# Patient Record
Sex: MALE | Race: OTHER | HISPANIC OR LATINO | URBAN - METROPOLITAN AREA
[De-identification: names, ages, dates, MRNs, and addresses within clinical notes are randomized per-mention and may not be internally consistent; named-entity substitution may affect disease eponyms.]

---

## 2023-09-28 ENCOUNTER — EMERGENCY (EMERGENCY)
Facility: HOSPITAL | Age: 33
LOS: 1 days | Discharge: HOME | End: 2023-09-28
Attending: EMERGENCY MEDICINE | Admitting: EMERGENCY MEDICINE
Payer: COMMERCIAL

## 2023-09-28 VITALS
HEART RATE: 92 BPM | DIASTOLIC BLOOD PRESSURE: 79 MMHG | RESPIRATION RATE: 16 BRPM | SYSTOLIC BLOOD PRESSURE: 129 MMHG | OXYGEN SATURATION: 98 % | TEMPERATURE: 98 F | HEIGHT: 72 IN | WEIGHT: 158.73 LBS

## 2023-09-28 DIAGNOSIS — Z88.1 ALLERGY STATUS TO OTHER ANTIBIOTIC AGENTS STATUS: ICD-10-CM

## 2023-09-28 DIAGNOSIS — Z87.438 PERSONAL HISTORY OF OTHER DISEASES OF MALE GENITAL ORGANS: ICD-10-CM

## 2023-09-28 DIAGNOSIS — N50.811 RIGHT TESTICULAR PAIN: ICD-10-CM

## 2023-09-28 DIAGNOSIS — A52.76: ICD-10-CM

## 2023-09-28 LAB
APPEARANCE UR: CLEAR — SIGNIFICANT CHANGE UP
BACTERIA # UR AUTO: PRESENT /HPF — SIGNIFICANT CHANGE UP
BILIRUB UR-MCNC: NEGATIVE — SIGNIFICANT CHANGE UP
COLOR SPEC: YELLOW — SIGNIFICANT CHANGE UP
COMMENT - URINE: SIGNIFICANT CHANGE UP
DIFF PNL FLD: NEGATIVE — SIGNIFICANT CHANGE UP
GLUCOSE UR QL: NEGATIVE MG/DL — SIGNIFICANT CHANGE UP
HIV 1 & 2 AB SERPL IA.RAPID: SIGNIFICANT CHANGE UP
KETONES UR-MCNC: NEGATIVE MG/DL — SIGNIFICANT CHANGE UP
LEUKOCYTE ESTERASE UR-ACNC: ABNORMAL
NITRITE UR-MCNC: NEGATIVE — SIGNIFICANT CHANGE UP
PH UR: 5.5 — SIGNIFICANT CHANGE UP (ref 5–8)
PROT UR-MCNC: NEGATIVE MG/DL — SIGNIFICANT CHANGE UP
RBC CASTS # UR COMP ASSIST: 0 /HPF — SIGNIFICANT CHANGE UP (ref 0–4)
SP GR SPEC: 1.02 — SIGNIFICANT CHANGE UP (ref 1–1.03)
UROBILINOGEN FLD QL: 1 MG/DL — SIGNIFICANT CHANGE UP (ref 0.2–1)
WBC UR QL: 0 /HPF — SIGNIFICANT CHANGE UP (ref 0–5)

## 2023-09-28 PROCEDURE — 76870 US EXAM SCROTUM: CPT | Mod: 26

## 2023-09-28 PROCEDURE — 99284 EMERGENCY DEPT VISIT MOD MDM: CPT

## 2023-09-28 RX ORDER — PENICILLIN G BENZATHINE 1200000 [IU]/2ML
2.4 INJECTION, SUSPENSION INTRAMUSCULAR ONCE
Refills: 0 | Status: COMPLETED | OUTPATIENT
Start: 2023-09-28 | End: 2023-09-28

## 2023-09-28 RX ORDER — CEFTRIAXONE 500 MG/1
500 INJECTION, POWDER, FOR SOLUTION INTRAMUSCULAR; INTRAVENOUS ONCE
Refills: 0 | Status: COMPLETED | OUTPATIENT
Start: 2023-09-28 | End: 2023-09-28

## 2023-09-28 RX ADMIN — PENICILLIN G BENZATHINE 2.4 MILLION UNIT(S): 1200000 INJECTION, SUSPENSION INTRAMUSCULAR at 20:23

## 2023-09-28 RX ADMIN — CEFTRIAXONE 500 MILLIGRAM(S): 500 INJECTION, POWDER, FOR SOLUTION INTRAMUSCULAR; INTRAVENOUS at 19:01

## 2023-09-28 RX ADMIN — Medication 100 MILLIGRAM(S): at 19:01

## 2023-09-28 NOTE — ED PROVIDER NOTE - CLINICAL SUMMARY MEDICAL DECISION MAKING FREE TEXT BOX
Ultrasound consistent with orchitis and likely associated to STI.  Covered for GC, chlamydia, syphilis.  Symptomatic treatment provided.  Recommend scrotal support.  Patient lives in [place] and is in [place] only for couple more days.  He will follow-up with urology once he gets back home to [place].

## 2023-09-28 NOTE — ED PROVIDER NOTE - GENITOURINARY, MLM
R testicle tenderness in epidimal area, normal lie, normal cremasteric reflex, tender testicle as well

## 2023-09-28 NOTE — ED PROVIDER NOTE - PATIENT PORTAL LINK FT
You can access the FollowMyHealth Patient Portal offered by [facility] by registering at the following website: [email or web address]. By joining [facility]’s FollowMyHealth portal, you will also be able to view your health information using other applications (apps) compatible with our system.

## 2023-09-28 NOTE — ED ADULT TRIAGE NOTE - CHIEF COMPLAINT QUOTE
patient walk in c/o rig tside testicular pain x1 day with swelling; seen by PCP yesterday who diagnosed patient with possible appendicitis and gave rx abx ciprofloxacin and flagyl?; had allergic reaction after taking both abx together this AM

## 2023-09-28 NOTE — ED PROVIDER NOTE - OBJECTIVE STATEMENT
33-year-old male patient on [employer] for Pr[employer] patient presents today for worsening right-sided testicular pain since yesterday.  Patient has had a similar episode in the past in which she was diagnosed with epididymitis.  He has had treatment in the past for chlamydia, gonorrhea, and syphilis.  He was seen at an urgent care and told he might have appendicitis?  And started on Cipro Flagyl.  He took a dose of that and then developed a rash that was itchy and was seen in another urgent care and told to discontinue medication and given a steroid dose with some Benadryl.  On my evaluation patient has right testicular pain but no abdominal pain or tenderness.  No fever no chills no urinary symptoms no penile discharge at this time.

## 2023-09-28 NOTE — ED PROVIDER NOTE - NSFOLLOWUPINSTRUCTIONS_ED_ALL_ED_FT
Orquitis  Orchitis  Male lower body area, showing the penis, testicle, scrotum, and epididymis.  La orquitis es la inflamación de un testículo. Los testículos son los órganos masculinos que producen esperma. Los testículos están ubicados en tea bolsa carnosa (escroto) que se encuentra detrás del pene. La orquitis por lo general afecta solo un testículo, jaya puede afectar a ambos.    La causa de la orquitis es tea infección. Muchos tipos de bacterias y virus pueden causar esta infección. La enfermedad puede aparecer en forma repentina.    ¿Cuáles son las causas?  Esta afección puede ser causada por lo siguiente:  Tea infección viral o bacteriana.  Otros organismos, pete hongos o parásitos. [place] es poco frecuente jaya puede ocurrir en los hombres que tienen el sistema de defensa del cuerpo (sistema inmunitario) debilitado, pete los hombres con VIH.  Bacterias    La orquitis bacteriana con frecuencia se presenta junto con tea infección del conducto que recibe y almacena el semen (epidídimo).  En los hombres que no son sexualmente activos, esta infección por lo general comienza pete tea infección de las vías urinarias y se extiende al testículo.  En los hombres sexualmente activos, las infecciones de transmisión sexual (ITS) son la causa más común de la orquitis bacteriana. Estas pueden incluir las siguientes:  Gonorrea.  Clamidia.  Virus    Las paperas son la causa más común de la orquitis viral, aunque actualmente las paperas son poco frecuentes en muchas áreas carlos a las vacunas.  Otros virus que pueden producir orquitis son los siguientes:  El virus de la varicela (virus de varicela zóster).  El virus que causa mononucleosis (virus de Rachel-Liang).  ¿Qué incrementa el riesgo?  Los siguientes factores pueden hacer que sea más propenso a desarrollar esta afección:  En el cat de la orquitis viral:  No haberse vacunado nunca contra las paperas.  En el cat de la orquitis bacteriana:  Lissette tenido infecciones de las vías urinarias frecuentes.  Participar en conductas sexuales de alto riesgo, pete tener múltiples parejas sexuales o tener relaciones sexuales sin usar condón.  Tener tea yaneli sexual con tea infección de transmisión sexual (ITS).  Haberse sometido a tea cirugía de las vías urinarias.  Usar tea sonda que se pasa por el pene para drenar la orina (catéter de [place]).  Tener la próstata agrandada.  ¿Cuáles son los signos o los síntomas?  Los síntomas más comunes de la orquitis son hinchazón y dolor en el escroto. Otros signos y síntomas pueden incluir los siguientes:  Sentirse enfermo (malestar general).  Fiebre y escalofríos.  Dolor al orinar.  Eyaculación dolorosa.  Dolor de sergei.  Fatiga.  Náuseas.  Rimma o secreción por el pene.  Hinchazón de los ganglios linfáticos de la farzad (ganglios de la rick inguinal).  ¿Cómo se diagnostica?  Esta afección se puede diagnosticar en función de lo siguiente:  Sangita síntomas. El médico puede sospechar la presencia de orquitis si tiene dolor e hinchazón en un testículo, además de otros signos y síntomas de la enfermedad.  Un examen físico.  También pueden hacerle otras pruebas, incluidas las siguientes:  Análisis de rimma para verificar si hay signos de infección.  Un análisis de orina para determinar la presencia de tea infección de las vías urinarias o tea infección de transmisión sexual (ITS).  La recolección de tea muestra de líquido de la punta del pene con un hisopo, para determinar la presencia de tea ITS.  Estudios por imágenes del testículo mediante ondas de bonita y tea computadora (ecografía testicular).  ¿Cómo se trata?  El tratamiento de esta afección depende de la causa.    En el cat de la orquitis bacteriana, el médico puede recetarle antibióticos. Las infecciones bacterianas por lo general desaparecen a los pocos días.    El tratamiento para las infecciones virales y las infecciones bacterianas puede incluir:  Reposo.  Medicamentos antiinflamatorios.  Analgésicos.  Levantar (elevar) el escroto con tea toalla o tea almohada y aplicar hielo.  Siga estas instrucciones en taveras casa:  Control del dolor y la hinchazón    Levante el escroto y aplique hielo según lo indicado. Para hacer esto:  Ponga el hielo en tea bolsa plástica.  Coloque tea toalla pequeña o tea almohada entre las piernas.  Apoye el escroto sobre la almohada o toalla.  Coloque otra toalla entre la piel y la bolsa de plástico.  Aplique el hielo iftikhar 20 minutos, 2 o 3 veces por día.  Retire el hielo si la piel se pone de color dan brillante. [place] es muy importante. Si no puede sentir dolor, calor o frío, tiene un mayor riesgo de que se dañe la rick.  Instrucciones generales    Ariane reposo pete se lo haya indicado el médico.  Use los medicamentos de venta maycol y los recetados solamente pete se lo haya indicado el médico.  Si le recetaron un antibiótico, tómelo pete se lo haya indicado el médico. No deje de moi el antibiótico aunque comience a sentirse mejor.  No tenga relaciones sexuales hasta que el médico le diga que puede hacerlo.  Concurra a todas las visitas de seguimiento. [place] es importante.  Comuníquese con un médico si:  Tiene fiebre.  El dolor y la hinchazón no mejoran después de 3 días.  Solicite ayuda de inmediato si:  El dolor empeora.  La hinchazón de los testículos empeora.  Resumen  La orquitis es la inflamación de un testículo. Está causada por tea infección bacteriana o viral.  Los síntomas más comunes de la orquitis son hinchazón y dolor en el escroto.  El tratamiento de esta afección depende de la causa. [place] puede incluir medicamentos para combatir la infección, reducir la inflamación y aliviar el dolor.  Siga las instrucciones del médico acerca de hacer reposo, aplicar hielo, no tener relaciones sexuales y moi los medicamentos.

## 2023-09-29 LAB
C TRACH RRNA SPEC QL NAA+PROBE: SIGNIFICANT CHANGE UP
N GONORRHOEA RRNA SPEC QL NAA+PROBE: SIGNIFICANT CHANGE UP
RPR SER-TITR: SIGNIFICANT CHANGE UP
RPR SERPL-ACNC: SIGNIFICANT CHANGE UP
SPECIMEN SOURCE: SIGNIFICANT CHANGE UP
T PALLIDUM AB TITR SER: POSITIVE
T PALLIDUM IGG SER QL IF: REACTIVE